# Patient Record
Sex: FEMALE | Race: WHITE | NOT HISPANIC OR LATINO
[De-identification: names, ages, dates, MRNs, and addresses within clinical notes are randomized per-mention and may not be internally consistent; named-entity substitution may affect disease eponyms.]

---

## 2017-01-03 PROBLEM — Z00.00 ENCOUNTER FOR PREVENTIVE HEALTH EXAMINATION: Noted: 2017-01-03

## 2017-01-10 ENCOUNTER — APPOINTMENT (OUTPATIENT)
Dept: SURGERY | Facility: CLINIC | Age: 32
End: 2017-01-10

## 2017-01-10 VITALS
WEIGHT: 120 LBS | HEART RATE: 70 BPM | DIASTOLIC BLOOD PRESSURE: 76 MMHG | HEIGHT: 62 IN | BODY MASS INDEX: 22.08 KG/M2 | SYSTOLIC BLOOD PRESSURE: 112 MMHG

## 2017-01-10 DIAGNOSIS — Z82.49 FAMILY HISTORY OF ISCHEMIC HEART DISEASE AND OTHER DISEASES OF THE CIRCULATORY SYSTEM: ICD-10-CM

## 2017-01-10 DIAGNOSIS — Z80.51 FAMILY HISTORY OF MALIGNANT NEOPLASM OF KIDNEY: ICD-10-CM

## 2017-01-10 DIAGNOSIS — D49.7 NEOPLASM OF UNSPECIFIED BEHAVIOR OF ENDOCRINE GLANDS AND OTHER PARTS OF NERVOUS SYSTEM: ICD-10-CM

## 2017-01-10 DIAGNOSIS — Z83.3 FAMILY HISTORY OF DIABETES MELLITUS: ICD-10-CM

## 2017-01-11 PROBLEM — Z82.49 FAMILY HISTORY OF HYPERTENSION: Status: ACTIVE | Noted: 2017-01-10

## 2017-01-11 PROBLEM — Z83.3 FAMILY HISTORY OF DIABETES MELLITUS: Status: ACTIVE | Noted: 2017-01-10

## 2017-01-11 PROBLEM — D49.7 THYROID NEOPLASM: Status: ACTIVE | Noted: 2017-01-11

## 2017-01-11 PROBLEM — Z80.51 FAMILY HISTORY OF MALIGNANT NEOPLASM OF KIDNEY: Status: ACTIVE | Noted: 2017-01-10

## 2017-01-11 RX ORDER — MULTIVITAMIN
TABLET ORAL
Refills: 0 | Status: ACTIVE | COMMUNITY

## 2017-01-30 ENCOUNTER — LABORATORY RESULT (OUTPATIENT)
Age: 32
End: 2017-01-30

## 2017-01-30 ENCOUNTER — APPOINTMENT (OUTPATIENT)
Dept: ENDOCRINOLOGY | Facility: CLINIC | Age: 32
End: 2017-01-30

## 2017-01-30 VITALS
OXYGEN SATURATION: 99 % | DIASTOLIC BLOOD PRESSURE: 64 MMHG | WEIGHT: 126 LBS | BODY MASS INDEX: 23.19 KG/M2 | SYSTOLIC BLOOD PRESSURE: 100 MMHG | HEIGHT: 62 IN | HEART RATE: 81 BPM

## 2017-01-30 RX ORDER — PSYLLIUM HUSK 0.4 G
CAPSULE ORAL
Refills: 0 | Status: ACTIVE | COMMUNITY

## 2017-02-06 LAB
T3RU NFR SERPL: 1.13 INDEX
T4 SERPL-MCNC: 8.2 UG/DL
TSH SERPL-ACNC: 1.08 UIU/ML

## 2017-02-22 ENCOUNTER — OUTPATIENT (OUTPATIENT)
Dept: OUTPATIENT SERVICES | Facility: HOSPITAL | Age: 32
LOS: 1 days | End: 2017-02-22
Payer: COMMERCIAL

## 2017-02-22 VITALS
OXYGEN SATURATION: 99 % | HEIGHT: 65 IN | HEART RATE: 80 BPM | DIASTOLIC BLOOD PRESSURE: 70 MMHG | RESPIRATION RATE: 14 BRPM | WEIGHT: 122.8 LBS | TEMPERATURE: 98 F | SYSTOLIC BLOOD PRESSURE: 111 MMHG

## 2017-02-22 DIAGNOSIS — Z01.818 ENCOUNTER FOR OTHER PREPROCEDURAL EXAMINATION: ICD-10-CM

## 2017-02-22 DIAGNOSIS — D49.7 NEOPLASM OF UNSPECIFIED BEHAVIOR OF ENDOCRINE GLANDS AND OTHER PARTS OF NERVOUS SYSTEM: ICD-10-CM

## 2017-02-22 LAB
ANION GAP SERPL CALC-SCNC: 15 MMOL/L — SIGNIFICANT CHANGE UP (ref 5–17)
BUN SERPL-MCNC: 12 MG/DL — SIGNIFICANT CHANGE UP (ref 7–23)
CALCIUM SERPL-MCNC: 9.7 MG/DL — SIGNIFICANT CHANGE UP (ref 8.4–10.5)
CHLORIDE SERPL-SCNC: 101 MMOL/L — SIGNIFICANT CHANGE UP (ref 96–108)
CO2 SERPL-SCNC: 23 MMOL/L — SIGNIFICANT CHANGE UP (ref 22–31)
CREAT SERPL-MCNC: 1.01 MG/DL — SIGNIFICANT CHANGE UP (ref 0.5–1.3)
GLUCOSE SERPL-MCNC: 91 MG/DL — SIGNIFICANT CHANGE UP (ref 70–99)
HCT VFR BLD CALC: 42.7 % — SIGNIFICANT CHANGE UP (ref 34.5–45)
HGB BLD-MCNC: 14.6 G/DL — SIGNIFICANT CHANGE UP (ref 11.5–15.5)
MCHC RBC-ENTMCNC: 30.3 PG — SIGNIFICANT CHANGE UP (ref 27–34)
MCHC RBC-ENTMCNC: 34.2 GM/DL — SIGNIFICANT CHANGE UP (ref 32–36)
MCV RBC AUTO: 88.6 FL — SIGNIFICANT CHANGE UP (ref 80–100)
PLATELET # BLD AUTO: 287 K/UL — SIGNIFICANT CHANGE UP (ref 150–400)
POTASSIUM SERPL-MCNC: 4.3 MMOL/L — SIGNIFICANT CHANGE UP (ref 3.5–5.3)
POTASSIUM SERPL-SCNC: 4.3 MMOL/L — SIGNIFICANT CHANGE UP (ref 3.5–5.3)
RBC # BLD: 4.82 M/UL — SIGNIFICANT CHANGE UP (ref 3.8–5.2)
RBC # FLD: 12.4 % — SIGNIFICANT CHANGE UP (ref 10.3–14.5)
SODIUM SERPL-SCNC: 139 MMOL/L — SIGNIFICANT CHANGE UP (ref 135–145)
WBC # BLD: 11.11 K/UL — HIGH (ref 3.8–10.5)
WBC # FLD AUTO: 11.11 K/UL — HIGH (ref 3.8–10.5)

## 2017-02-22 PROCEDURE — 85027 COMPLETE CBC AUTOMATED: CPT

## 2017-02-22 PROCEDURE — 80048 BASIC METABOLIC PNL TOTAL CA: CPT

## 2017-02-22 RX ORDER — VANCOMYCIN HCL 1 G
750 VIAL (EA) INTRAVENOUS ONCE
Qty: 0 | Refills: 0 | Status: DISCONTINUED | OUTPATIENT
Start: 2017-03-10 | End: 2017-03-10

## 2017-02-22 RX ORDER — SODIUM CHLORIDE 9 MG/ML
3 INJECTION INTRAMUSCULAR; INTRAVENOUS; SUBCUTANEOUS EVERY 8 HOURS
Qty: 0 | Refills: 0 | Status: DISCONTINUED | OUTPATIENT
Start: 2017-03-10 | End: 2017-03-10

## 2017-02-22 NOTE — H&P PST ADULT - NSANTHOSAYNRD_GEN_A_CORE
No. ZAIN screening performed.  STOP BANG Legend: 0-2 = LOW Risk; 3-4 = INTERMEDIATE Risk; 5-8 = HIGH Risk

## 2017-02-22 NOTE — H&P PST ADULT - ASSESSMENT
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

## 2017-02-22 NOTE — H&P PST ADULT - LYMPHATIC
supraclavicular L/posterior cervical R/posterior cervical L/anterior cervical R/anterior cervical L/supraclavicular R

## 2017-02-22 NOTE — H&P PST ADULT - PMH
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

## 2017-02-22 NOTE — H&P PST ADULT - PROBLEM SELECTOR PLAN 1
Total Thyroidectomy  Pre- op instructions given  medical eval done by PMD, EKG and Echo done called for the results

## 2017-02-22 NOTE — H&P PST ADULT - RS GEN PE MLT RESP DETAILS PC
breath sounds equal/respirations non-labored/clear to auscultation bilaterally/good air movement/airway patent/normal/no chest wall tenderness

## 2017-03-09 ENCOUNTER — RESULT REVIEW (OUTPATIENT)
Age: 32
End: 2017-03-09

## 2017-03-10 ENCOUNTER — OUTPATIENT (OUTPATIENT)
Dept: INPATIENT UNIT | Facility: HOSPITAL | Age: 32
LOS: 1 days | End: 2017-03-10
Payer: COMMERCIAL

## 2017-03-10 VITALS
OXYGEN SATURATION: 95 % | DIASTOLIC BLOOD PRESSURE: 57 MMHG | RESPIRATION RATE: 18 BRPM | HEART RATE: 60 BPM | SYSTOLIC BLOOD PRESSURE: 96 MMHG

## 2017-03-10 VITALS
TEMPERATURE: 98 F | DIASTOLIC BLOOD PRESSURE: 78 MMHG | RESPIRATION RATE: 18 BRPM | SYSTOLIC BLOOD PRESSURE: 112 MMHG | HEART RATE: 70 BPM | WEIGHT: 121.92 LBS | HEIGHT: 62 IN | OXYGEN SATURATION: 98 %

## 2017-03-10 DIAGNOSIS — D49.7 NEOPLASM OF UNSPECIFIED BEHAVIOR OF ENDOCRINE GLANDS AND OTHER PARTS OF NERVOUS SYSTEM: ICD-10-CM

## 2017-03-10 LAB — HCG UR QL: NEGATIVE — SIGNIFICANT CHANGE UP

## 2017-03-10 PROCEDURE — 88307 TISSUE EXAM BY PATHOLOGIST: CPT | Mod: 26

## 2017-03-10 PROCEDURE — 60240 REMOVAL OF THYROID: CPT

## 2017-03-10 PROCEDURE — 88305 TISSUE EXAM BY PATHOLOGIST: CPT | Mod: 26

## 2017-03-10 PROCEDURE — 81025 URINE PREGNANCY TEST: CPT

## 2017-03-10 PROCEDURE — 88305 TISSUE EXAM BY PATHOLOGIST: CPT

## 2017-03-10 PROCEDURE — 88307 TISSUE EXAM BY PATHOLOGIST: CPT

## 2017-03-10 PROCEDURE — C1889: CPT

## 2017-03-10 RX ORDER — SODIUM CHLORIDE 9 MG/ML
1000 INJECTION, SOLUTION INTRAVENOUS
Qty: 0 | Refills: 0 | Status: DISCONTINUED | OUTPATIENT
Start: 2017-03-10 | End: 2017-03-10

## 2017-03-10 RX ORDER — NORETHINDRONE AND ETHINYL ESTRADIOL 0.4-0.035
1 KIT ORAL
Qty: 0 | Refills: 0 | COMMUNITY

## 2017-03-10 RX ORDER — PSYLLIUM SEED (WITH DEXTROSE)
1 POWDER (GRAM) ORAL
Qty: 0 | Refills: 0 | COMMUNITY

## 2017-03-10 RX ORDER — LEVOTHYROXINE SODIUM 125 MCG
1 TABLET ORAL
Qty: 30 | Refills: 2 | OUTPATIENT
Start: 2017-03-10 | End: 2017-06-07

## 2017-03-10 RX ORDER — ONDANSETRON 8 MG/1
4 TABLET, FILM COATED ORAL ONCE
Qty: 0 | Refills: 0 | Status: DISCONTINUED | OUTPATIENT
Start: 2017-03-10 | End: 2017-03-10

## 2017-03-10 RX ORDER — HYDROMORPHONE HYDROCHLORIDE 2 MG/ML
0.25 INJECTION INTRAMUSCULAR; INTRAVENOUS; SUBCUTANEOUS
Qty: 0 | Refills: 0 | Status: DISCONTINUED | OUTPATIENT
Start: 2017-03-10 | End: 2017-03-10

## 2017-03-10 RX ORDER — LEVOTHYROXINE SODIUM 125 MCG
100 TABLET ORAL DAILY
Qty: 0 | Refills: 0 | Status: DISCONTINUED | OUTPATIENT
Start: 2017-03-10 | End: 2017-03-10

## 2017-03-10 RX ORDER — HYDROMORPHONE HYDROCHLORIDE 2 MG/ML
0.5 INJECTION INTRAMUSCULAR; INTRAVENOUS; SUBCUTANEOUS
Qty: 0 | Refills: 0 | Status: DISCONTINUED | OUTPATIENT
Start: 2017-03-10 | End: 2017-03-10

## 2017-03-10 RX ORDER — ACETAMINOPHEN 500 MG
1000 TABLET ORAL ONCE
Qty: 0 | Refills: 0 | Status: COMPLETED | OUTPATIENT
Start: 2017-03-10 | End: 2017-03-10

## 2017-03-10 RX ORDER — OXYCODONE HYDROCHLORIDE 5 MG/1
1 TABLET ORAL
Qty: 12 | Refills: 0 | OUTPATIENT
Start: 2017-03-10 | End: 2017-03-12

## 2017-03-10 RX ADMIN — HYDROMORPHONE HYDROCHLORIDE 0.5 MILLIGRAM(S): 2 INJECTION INTRAMUSCULAR; INTRAVENOUS; SUBCUTANEOUS at 15:31

## 2017-03-10 RX ADMIN — HYDROMORPHONE HYDROCHLORIDE 0.5 MILLIGRAM(S): 2 INJECTION INTRAMUSCULAR; INTRAVENOUS; SUBCUTANEOUS at 14:34

## 2017-03-10 RX ADMIN — SODIUM CHLORIDE 3 MILLILITER(S): 9 INJECTION INTRAMUSCULAR; INTRAVENOUS; SUBCUTANEOUS at 05:56

## 2017-03-10 RX ADMIN — Medication 100 MICROGRAM(S): at 15:31

## 2017-03-10 RX ADMIN — HYDROMORPHONE HYDROCHLORIDE 0.5 MILLIGRAM(S): 2 INJECTION INTRAMUSCULAR; INTRAVENOUS; SUBCUTANEOUS at 11:30

## 2017-03-10 RX ADMIN — Medication 400 MILLIGRAM(S): at 11:05

## 2017-03-10 RX ADMIN — SODIUM CHLORIDE 75 MILLILITER(S): 9 INJECTION, SOLUTION INTRAVENOUS at 11:45

## 2017-03-10 RX ADMIN — HYDROMORPHONE HYDROCHLORIDE 0.5 MILLIGRAM(S): 2 INJECTION INTRAMUSCULAR; INTRAVENOUS; SUBCUTANEOUS at 11:20

## 2017-03-10 RX ADMIN — Medication 1000 MILLIGRAM(S): at 11:30

## 2017-03-10 NOTE — BRIEF OPERATIVE NOTE - OPERATION/FINDINGS
L thyroid nodule w/ [place] 3 FNA; R thyroid nodule w/ [place] 2 FNA; genetic testing done w/ 80% likelihood of malignancy  L thyroid lobe very enlarged; b/l RLN identified and preserved; #7 flat MYLES placed underneath strap muscles    OPERATION: total thyroidectomy

## 2017-03-10 NOTE — ASU DISCHARGE PLAN (ADULT/PEDIATRIC). - MEDICATION SUMMARY - MEDICATIONS TO TAKE
I will START or STAY ON the medications listed below when I get home from the hospital:    oxyCODONE 5 mg oral tablet  -- 1 tab(s) by mouth every 4 hours, As Needed -for severe pain MDD:6  -- Indication: For Postoperative pain    Tylenol 500 mg oral tablet  -- 2 tab(s) by mouth every 6 hours  -- Indication: For Postoperative pain    Motrin 400 mg oral tablet  -- 1 tab(s) by mouth every 6 hours  -- Indication: For Postoperative pain    Synthroid 100 mcg (0.1 mg) oral tablet  -- 1 tab(s) by mouth once a day MDD:1  -- Indication: For Hypothryoidism after thyroidectomy    Calcium 500+D oral tablet, chewable  -- 1 tab(s) by mouth 2 times a day MDD:2  -- Indication: For Calcium supplementation

## 2017-03-10 NOTE — ASU DISCHARGE PLAN (ADULT/PEDIATRIC). - FOLLOWUP APPOINTMENT CLINIC/PHYSICIAN
Please make an appointment to see Dr. Paulson on Monday, 3/13, for drain removal and follow up. Please also follow up with your endocrinologist.

## 2017-03-13 ENCOUNTER — APPOINTMENT (OUTPATIENT)
Dept: SURGERY | Facility: CLINIC | Age: 32
End: 2017-03-13

## 2017-03-13 VITALS
HEART RATE: 80 BPM | WEIGHT: 122 LBS | SYSTOLIC BLOOD PRESSURE: 134 MMHG | DIASTOLIC BLOOD PRESSURE: 85 MMHG | BODY MASS INDEX: 22.45 KG/M2 | HEIGHT: 62 IN

## 2017-03-13 LAB — SURGICAL PATHOLOGY STUDY: SIGNIFICANT CHANGE UP

## 2017-03-13 RX ORDER — ACETAMINOPHEN 500 MG
2 TABLET ORAL
Qty: 0 | Refills: 0 | COMMUNITY

## 2017-03-13 RX ORDER — IBUPROFEN 200 MG
1 TABLET ORAL
Qty: 0 | Refills: 0 | COMMUNITY

## 2017-03-24 ENCOUNTER — APPOINTMENT (OUTPATIENT)
Dept: SURGERY | Facility: CLINIC | Age: 32
End: 2017-03-24

## 2017-03-24 DIAGNOSIS — Z78.9 OTHER SPECIFIED HEALTH STATUS: ICD-10-CM

## 2017-04-06 PROBLEM — Z78.9 NON-SMOKER: Status: ACTIVE | Noted: 2017-03-13

## 2017-04-11 ENCOUNTER — APPOINTMENT (OUTPATIENT)
Dept: SURGERY | Facility: CLINIC | Age: 32
End: 2017-04-11

## 2017-04-11 DIAGNOSIS — D49.7 NEOPLASM OF UNSPECIFIED BEHAVIOR OF ENDOCRINE GLANDS AND OTHER PARTS OF NERVOUS SYSTEM: ICD-10-CM

## 2017-04-13 ENCOUNTER — FORM ENCOUNTER (OUTPATIENT)
Age: 32
End: 2017-04-13

## 2017-04-14 ENCOUNTER — OUTPATIENT (OUTPATIENT)
Dept: OUTPATIENT SERVICES | Facility: HOSPITAL | Age: 32
LOS: 1 days | End: 2017-04-14
Payer: COMMERCIAL

## 2017-04-14 ENCOUNTER — APPOINTMENT (OUTPATIENT)
Dept: RADIOLOGY | Facility: CLINIC | Age: 32
End: 2017-04-14

## 2017-04-14 DIAGNOSIS — Z00.8 ENCOUNTER FOR OTHER GENERAL EXAMINATION: ICD-10-CM

## 2017-04-14 PROCEDURE — 71046 X-RAY EXAM CHEST 2 VIEWS: CPT

## 2017-05-02 ENCOUNTER — APPOINTMENT (OUTPATIENT)
Dept: ENDOCRINOLOGY | Facility: CLINIC | Age: 32
End: 2017-05-02

## 2017-05-02 VITALS
HEIGHT: 62 IN | BODY MASS INDEX: 23.19 KG/M2 | SYSTOLIC BLOOD PRESSURE: 100 MMHG | HEART RATE: 78 BPM | DIASTOLIC BLOOD PRESSURE: 60 MMHG | OXYGEN SATURATION: 99 % | WEIGHT: 126 LBS

## 2017-05-02 DIAGNOSIS — E04.2 NONTOXIC MULTINODULAR GOITER: ICD-10-CM

## 2017-05-04 PROBLEM — E04.2 MULTINODULAR GOITER: Status: RESOLVED | Noted: 2017-01-31 | Resolved: 2017-05-04

## 2017-05-04 LAB
CALCIUM SERPL-MCNC: 9.3 MG/DL
PARATHYROID HORMONE INTACT: 20 PG/ML

## 2017-05-23 ENCOUNTER — APPOINTMENT (OUTPATIENT)
Dept: SURGERY | Facility: CLINIC | Age: 32
End: 2017-05-23

## 2017-08-01 ENCOUNTER — RX RENEWAL (OUTPATIENT)
Age: 32
End: 2017-08-01

## 2017-11-10 ENCOUNTER — RX RENEWAL (OUTPATIENT)
Age: 32
End: 2017-11-10

## 2017-12-28 ENCOUNTER — LABORATORY RESULT (OUTPATIENT)
Age: 32
End: 2017-12-28

## 2017-12-28 ENCOUNTER — APPOINTMENT (OUTPATIENT)
Dept: ENDOCRINOLOGY | Facility: CLINIC | Age: 32
End: 2017-12-28
Payer: COMMERCIAL

## 2017-12-28 VITALS
BODY MASS INDEX: 23.19 KG/M2 | SYSTOLIC BLOOD PRESSURE: 108 MMHG | OXYGEN SATURATION: 98 % | DIASTOLIC BLOOD PRESSURE: 70 MMHG | HEART RATE: 74 BPM | HEIGHT: 62 IN | WEIGHT: 126 LBS

## 2017-12-28 PROCEDURE — 99213 OFFICE O/P EST LOW 20 MIN: CPT

## 2017-12-29 LAB
T3RU NFR SERPL: 1.11 INDEX
T4 SERPL-MCNC: 9 UG/DL
TSH SERPL-ACNC: 5.18 UIU/ML

## 2018-05-02 NOTE — H&P PST ADULT - FALL HARM RISK CONCLUSION
I personally performed the service described in the documentation recorded by the scribe in my presence, and it accurately and completely records my words and actions.
Universal Safety Interventions

## 2018-07-16 PROBLEM — E04.1 NONTOXIC SINGLE THYROID NODULE: Chronic | Status: ACTIVE | Noted: 2017-02-22

## 2018-12-10 ENCOUNTER — RX RENEWAL (OUTPATIENT)
Age: 33
End: 2018-12-10

## 2019-01-07 ENCOUNTER — CLINICAL ADVICE (OUTPATIENT)
Age: 34
End: 2019-01-07

## 2019-03-01 ENCOUNTER — CLINICAL ADVICE (OUTPATIENT)
Age: 34
End: 2019-03-01

## 2019-06-27 PROBLEM — D49.7 NEOPLASM OF UNSPECIFIED BEHAVIOR OF ENDOCRINE GLANDS AND OTHER PARTS OF NERVOUS SYSTEM: Chronic | Status: ACTIVE | Noted: 2017-02-22

## 2019-07-16 ENCOUNTER — CLINICAL ADVICE (OUTPATIENT)
Age: 34
End: 2019-07-16

## 2019-07-16 NOTE — BRIEF OPERATIVE NOTE - ESTIMATED BLOOD LOSS
PATIENT INFORMATION    Anticipatory guidance:  Poison Control phone number [phone]  Setting hot water heater less than 120 degrees F  Use of transitional object (clemente bear, etc.) to help with sleep    Follow-Up  - Return for your 3 year well child visit.    2 years old Health and Safety Tips - The following hyperlinks are available to access via [employer]    Bright Futures 3 Years Old Parent Education    Fut[employer]s [employer]illantes 2 años de edad (Educación para los padres) - Español    Common dosing for acetaminophen and ibuprofen:   Acetaminophen (Tylenol, etc.) can be given every 4 hours.  · Infant Drops: 160mg/5ml for  Weight 18-23 lbs 24-35 lbs   Dose 3.75 ml 5 ml      · Children's Elixir: 160mg/5ml  Weight 24-35 lbs 36-47 lbs 48-59 lbs 60-71 lsb 72-95 lbs   Dose 5 ml 7.5 ml 10 ml 12.5 ml 15 ml     Ibuprofen (motrin) can be given every 6 hours.  · Infant Drops: 50mg/1.25ml  Weight 18-23 lbs   Dose 1.875     · Children's Elixir 100mg/5ml   Weight 24-35 lbs 36-47 lbs 48-59 lbs 60-71 lbs 72-95 lbs   Dose 1 tsp 1 1/2 tsp 2 tsp 2 1/2 tsp 3 tsp     Additional Educational Resources:  For additional resources regarding your symptoms, diagnosis, or further health information, please visit the Discover a Healthier You section on [email or web address] or the Online Health Resources section in [employer].   5

## 2019-07-29 ENCOUNTER — APPOINTMENT (OUTPATIENT)
Dept: ENDOCRINOLOGY | Facility: CLINIC | Age: 34
End: 2019-07-29

## 2019-11-18 ENCOUNTER — APPOINTMENT (OUTPATIENT)
Dept: ENDOCRINOLOGY | Facility: CLINIC | Age: 34
End: 2019-11-18
Payer: COMMERCIAL

## 2019-11-18 VITALS
OXYGEN SATURATION: 98 % | SYSTOLIC BLOOD PRESSURE: 100 MMHG | BODY MASS INDEX: 25.95 KG/M2 | DIASTOLIC BLOOD PRESSURE: 70 MMHG | HEART RATE: 76 BPM | WEIGHT: 141 LBS | HEIGHT: 62 IN

## 2019-11-18 PROCEDURE — 99214 OFFICE O/P EST MOD 30 MIN: CPT

## 2019-11-18 NOTE — HISTORY OF PRESENT ILLNESS
[FreeTextEntry1] : cc: postoperative hypothyroidism\par \par 33 year old woman with history of thyroid nodules, had FNA with [place] 3 (AUS) and thyroseq showing kras mutation.  She underwent total thyroidectomy in March 2017.\par Surgical pathology showed follicular adenoma\par Now 12 weeks postpartum, taking  levothyroxine tx, dose was decreased to 112 micrograms daily last week. She takes it on an empty stomach separate from food and vitamins\par No recent change in weight.  No tremor, palpitations.\par \par \par Has noted numbness/heat sensation in feet and hands for past few weeks.\par \par \par \par \par

## 2019-11-18 NOTE — ASSESSMENT
[FreeTextEntry1] : 33 year old woman post thyroidectomy for thyroid nodule with AUS cytology/ kras mutation with benign surgical pathology, now with hypothyroidism on levothyroxine tx, 12 weeks postpartum\par   - Dose was decreased last week.  Repeat TFTs in 6-8 weeks, adjust levothyroxine as needed\par \par Numbness - Discussed potential etiologies.   Recommend discussing peripheral numbness with PMD if symptoms do not resolve\par \par f/u  one year\par

## 2019-11-18 NOTE — PHYSICAL EXAM
[No Acute Distress] : no acute distress [Alert] : alert [Well Nourished] : well nourished [Normal Sclera/Conjunctiva] : normal sclera/conjunctiva [No Proptosis] : no proptosis [Well Developed] : well developed [No Neck Mass] : no neck mass was observed [Well Healed Scar] : well healed scar [No LAD] : no lymphadenopathy [No Respiratory Distress] : no respiratory distress [Clear to Auscultation] : lungs were clear to auscultation bilaterally [Regular Rhythm] : with a regular rhythm [Normal S1, S2] : normal S1 and S2 [Normal Bowel Sounds] : normal bowel sounds [No Edema] : there was no peripheral edema [Soft] : abdomen soft [No Spinal Tenderness] : no spinal tenderness [Not Tender] : non-tender [Normal Strength/Tone] : muscle strength and tone were normal [Normal Reflexes] : deep tendon reflexes were 2+ and symmetric [No Tremors] : no tremors [Normal Affect] : the affect was normal [Normal Mood] : the mood was normal

## 2020-11-30 ENCOUNTER — LABORATORY RESULT (OUTPATIENT)
Age: 35
End: 2020-11-30

## 2020-11-30 ENCOUNTER — APPOINTMENT (OUTPATIENT)
Dept: ENDOCRINOLOGY | Facility: CLINIC | Age: 35
End: 2020-11-30
Payer: COMMERCIAL

## 2020-11-30 VITALS
SYSTOLIC BLOOD PRESSURE: 100 MMHG | HEIGHT: 62 IN | HEART RATE: 75 BPM | BODY MASS INDEX: 22.08 KG/M2 | TEMPERATURE: 98.3 F | DIASTOLIC BLOOD PRESSURE: 70 MMHG | OXYGEN SATURATION: 98 % | WEIGHT: 120 LBS

## 2020-11-30 PROCEDURE — 99072 ADDL SUPL MATRL&STAF TM PHE: CPT

## 2020-11-30 PROCEDURE — 99213 OFFICE O/P EST LOW 20 MIN: CPT

## 2020-11-30 NOTE — PHYSICAL EXAM
[Alert] : alert [Healthy Appearance] : healthy appearance [No Acute Distress] : no acute distress [Normal Sclera/Conjunctiva] : normal sclera/conjunctiva [No Proptosis] : no proptosis [No Neck Mass] : no neck mass was observed [No LAD] : no lymphadenopathy [Well Healed Scar] : well healed scar [No Respiratory Distress] : no respiratory distress [Clear to Auscultation] : lungs were clear to auscultation bilaterally [Normal S1, S2] : normal S1 and S2 [Regular Rhythm] : with a regular rhythm [No Edema] : no peripheral edema [Normal Bowel Sounds] : normal bowel sounds [Not Tender] : non-tender [Soft] : abdomen soft [No Spinal Tenderness] : no spinal tenderness [No Involuntary Movements] : no involuntary movements were seen [Normal Reflexes] : deep tendon reflexes were 2+ and symmetric [No Tremors] : no tremors [Normal Affect] : the affect was normal [Normal Mood] : the mood was normal

## 2020-12-01 LAB
T3RU NFR SERPL: 1 TBI
T4 SERPL-MCNC: 8 UG/DL
TSH SERPL-ACNC: 2.13 UIU/ML

## 2020-12-01 NOTE — ASSESSMENT
[FreeTextEntry1] : 34 year old woman post thyroidectomy for thyroid nodule with AUS cytology/ kras mutation with benign surgical pathology, now with hypothyroidism on levothyroxine tx.\par  - Clinically euthyroid\par   - Check tfts and adjust levothyroxine dose as needed\par \par Numbness - has resolved, may have been related to reaction to vancomycin\par \par f/u  one year

## 2020-12-01 NOTE — DATA REVIEWED
[FreeTextEntry1] : 1/2020\par TSH 0.09\par FTI 3.2\par \par \par \par 11/2019\par TSH 0.03\par FTI 3.8

## 2020-12-01 NOTE — HISTORY OF PRESENT ILLNESS
[FreeTextEntry1] : cc: postoperative hypothyroidism\par \par 34 year old woman with history of thyroid nodules, had FNA with [place] 3 (AUS) and thyroseq showing kras mutation.  She underwent total thyroidectomy in March 2017.\par Surgical pathology showed follicular adenoma\par She has been feeling well.  She takes levothyroxine tx,daily on an empty stomach separate from food and vitamins\par No cold intolerance, constipation, fatigue.  No recent change in weight.  No tremor, palpitations.\par \par Had mastitis, then c diff infection, is on antibiotics\par \par Numbness had resolved, then returned when taking vancomycin.  Resolved again off Vanco\par \par

## 2020-12-02 ENCOUNTER — NON-APPOINTMENT (OUTPATIENT)
Age: 35
End: 2020-12-02

## 2021-02-16 ENCOUNTER — RX RENEWAL (OUTPATIENT)
Age: 36
End: 2021-02-16

## 2022-02-22 ENCOUNTER — RX RENEWAL (OUTPATIENT)
Age: 37
End: 2022-02-22

## 2022-03-21 ENCOUNTER — RX CHANGE (OUTPATIENT)
Age: 37
End: 2022-03-21

## 2022-04-26 ENCOUNTER — APPOINTMENT (OUTPATIENT)
Dept: ENDOCRINOLOGY | Facility: CLINIC | Age: 37
End: 2022-04-26
Payer: COMMERCIAL

## 2022-04-26 VITALS
DIASTOLIC BLOOD PRESSURE: 80 MMHG | HEART RATE: 67 BPM | OXYGEN SATURATION: 96 % | SYSTOLIC BLOOD PRESSURE: 124 MMHG | BODY MASS INDEX: 23.59 KG/M2 | WEIGHT: 129 LBS | TEMPERATURE: 98.2 F

## 2022-04-26 DIAGNOSIS — R20.0 ANESTHESIA OF SKIN: ICD-10-CM

## 2022-04-26 PROCEDURE — 99214 OFFICE O/P EST MOD 30 MIN: CPT

## 2022-04-26 NOTE — PHYSICAL EXAM
[Alert] : alert [Healthy Appearance] : healthy appearance [No Acute Distress] : no acute distress [Normal Sclera/Conjunctiva] : normal sclera/conjunctiva [No Proptosis] : no proptosis [No Neck Mass] : no neck mass was observed [No Respiratory Distress] : no respiratory distress [Clear to Auscultation] : lungs were clear to auscultation bilaterally [Normal S1, S2] : normal S1 and S2 [Regular Rhythm] : with a regular rhythm [No Edema] : no peripheral edema [Normal Bowel Sounds] : normal bowel sounds [Soft] : abdomen soft [No Spinal Tenderness] : no spinal tenderness [No Involuntary Movements] : no involuntary movements were seen [Normal Strength/Tone] : muscle strength and tone were normal [Normal Reflexes] : deep tendon reflexes were 2+ and symmetric [No Tremors] : no tremors [Normal Affect] : the affect was normal [Normal Mood] : the mood was normal

## 2022-04-26 NOTE — ASSESSMENT
[FreeTextEntry1] : 34 year old woman post thyroidectomy for thyroid nodule with AUS cytology/ kras mutation with benign surgical pathology, now with hypothyroidism on levothyroxine tx.\par  - Pt with symptoms of hypothyroidism, check TFTs and adjust levothyroxine dose as needed\par \par Numbness - recommend speaking to PMD regarding further evaluation and management.\par  - Will also check A1c to screen for diabetes\par \par f/u  one year

## 2022-04-26 NOTE — HISTORY OF PRESENT ILLNESS
[FreeTextEntry1] : cc: postoperative hypothyroidism\par \par 36 year old woman with history of thyroid nodules, had FNA with [place] 3 (AUS) and thyroseq showing kras mutation.  She underwent total thyroidectomy in March 2017.\par Surgical pathology showed follicular adenoma\par She takes levothyroxine 100 micrograms,daily on an empty stomach separate from food and vitamins\par She has been feeling well overall.  Has been tired recently, no cold intolerance, constipation,has gained weight and having trouble losing it.  No tremor, palpitations.\par \par Has noted intermittent numbness in her toes.\par \par \par \par

## 2022-04-27 LAB
ESTIMATED AVERAGE GLUCOSE: 88 MG/DL
HBA1C MFR BLD HPLC: 4.7 %
T4 FREE SERPL-MCNC: 1.5 NG/DL
TSH SERPL-ACNC: 5.53 UIU/ML

## 2022-09-16 ENCOUNTER — NON-APPOINTMENT (OUTPATIENT)
Age: 37
End: 2022-09-16

## 2022-11-15 NOTE — H&P PST ADULT - NSANTHAGERD_ENT_A_CORE
[Fever] : no fever [Chills] : no chills [Night Sweats] : no night sweats [Recent Change In Weight] : ~T recent weight change [Visual Disturbances] : visual disturbances [Negative] : Psychiatric [FreeTextEntry2] : intentional weight loss 20 lbs [FreeTextEntry3] : right visual field compromised No

## 2023-02-10 ENCOUNTER — NON-APPOINTMENT (OUTPATIENT)
Age: 38
End: 2023-02-10

## 2023-04-03 ENCOUNTER — RX RENEWAL (OUTPATIENT)
Age: 38
End: 2023-04-03

## 2023-06-07 ENCOUNTER — RX RENEWAL (OUTPATIENT)
Age: 38
End: 2023-06-07

## 2024-01-10 ENCOUNTER — RX RENEWAL (OUTPATIENT)
Age: 39
End: 2024-01-10

## 2024-02-23 NOTE — H&P PST ADULT - HISTORY OF PRESENT ILLNESS
Conjuntivae and eyelids appear normal, Sclerae : White without injection; EOMi
32 y/o female with h/o thyroid nodules diagnosed in 2013(Rt and Left) ,the nodules was benign at that time  .Pt  has been F/U with  endocrinology  for the FNA . In January ultra sound showed growth of both nodules Rt one was benign and left nodule Robert  with mutation, pt denies any  dyspnea or dysphagia. Now Pt  coming in PST for Total Thyroidectomy on 310/2017.

## 2024-03-14 NOTE — H&P PST ADULT - MOUTH
"Daily Note     Today's date: 3/14/2024  Patient name: Ralph Leyva  : 1961  MRN: [id number]  Referring provider: Sergio Reid*  Dx:   Encounter Diagnosis     ICD-10-CM    1. Tear of left supraspinatus tendon  M75.102           Start Time: 1405  Stop Time: 1445  Total time in clinic (min): 40 minutes    Subjective: Pt stated that he has no new complaints of pain or discomfort since last visit.       Objective: See treatment diary below      Assessment: Pt tolerated today's session well with no adverse symptoms. Pt needed minimal VCing for form and technique. Pt tolerated application of ice at end of session well with reported decrease in discomfort post modality. Pt would benefit from continued skilled PT to improve L shoulder mobility, PROM, scapular stability, strength, and functional ability.     Plan: Continue per plan of care.  Progress treatment as tolerated.       Precautions: s/p L shoulder arthroscopic RTC repair with biceps tenodesis on 24  As per MD (24): NWB LUE in sling x 6 weeks (remove sling on 24).  Can remove Pillow at any time as it's for comfort only.  For now, may start elbow/wrist/hand range of motion. Pendulums to tolerance   Protocol in office visit with MD on 24    Date 2/27 2/29 3/5 3/7 3/12 3/14       Visit # IE 2 3 4 5 6       FOTO IE             Re-eval IE              Manuals 2/27 2/29 3/5 3/7 3/12 3/14       L shoulder PROM nv HY KM DK DK HY                                              Neuro Re-Ed 2/27 2/29 3/5 3/7 3/12 3/14       Scap retracts 5x3\" 2x10 3\" 2x10 3\" 20x 3\" 20x5\" 20x5\"       Pendulums 30\" fwd,s/s 30\" fwd,s/s 30\" fwd, s/s 1' fwd, s/s, circles c,cc 1' fwd, s/s, circles 1' fwd, s/s, circles                                                           Pt Edu DK (sling, HEP)   NIELS DK        Ther Ex 2/27 2/29 3/5 3/7 3/12 3/14       Wrist flex AROM 10x  2x10 2x10 2x10 2x10 2x10       Wrist ext AROM 10x 2x10 2x10 2x10 2x10 2x10       Elbow " "flex/ext AROM 10x  2x10 2x10 2x10 2x10  2x10       Towel squeezes nv Red 20x5\" Red 20x5\" 20x5\" Red 20x5\" green 20x5\" green       Table slides hold 10x10\" pain free 10x10\" 5x10\" pain free flex 10x10\" pain free flex 10x10\" pain free flex                                              Ther Activity  2/29 3/5   3/14                                 Gait Training  2/29 3/5                                    Modalities 2/27 2/29 3/5 3/7 3/12 3/14       ice prn CP 10' post CP 10' post 10' post 10' post 10' post                                " normal mouth and gums/moist

## 2024-04-01 ENCOUNTER — APPOINTMENT (OUTPATIENT)
Dept: ENDOCRINOLOGY | Facility: CLINIC | Age: 39
End: 2024-04-01
Payer: COMMERCIAL

## 2024-04-01 VITALS
DIASTOLIC BLOOD PRESSURE: 62 MMHG | OXYGEN SATURATION: 98 % | WEIGHT: 140 LBS | BODY MASS INDEX: 25.76 KG/M2 | HEART RATE: 64 BPM | SYSTOLIC BLOOD PRESSURE: 116 MMHG | HEIGHT: 62 IN

## 2024-04-01 DIAGNOSIS — E89.0 POSTPROCEDURAL HYPOTHYROIDISM: ICD-10-CM

## 2024-04-01 DIAGNOSIS — R63.5 ABNORMAL WEIGHT GAIN: ICD-10-CM

## 2024-04-01 PROCEDURE — G2211 COMPLEX E/M VISIT ADD ON: CPT

## 2024-04-01 PROCEDURE — 99214 OFFICE O/P EST MOD 30 MIN: CPT

## 2024-04-01 NOTE — PHYSICAL EXAM
[Alert] : alert [Healthy Appearance] : healthy appearance [No Acute Distress] : no acute distress [No Proptosis] : no proptosis [Normal Sclera/Conjunctiva] : normal sclera/conjunctiva [No Neck Mass] : no neck mass was observed [No LAD] : no lymphadenopathy [Well Healed Scar] : well healed scar [Clear to Auscultation] : lungs were clear to auscultation bilaterally [No Respiratory Distress] : no respiratory distress [Regular Rhythm] : with a regular rhythm [Normal S1, S2] : normal S1 and S2 [No Edema] : no peripheral edema [Normal Bowel Sounds] : normal bowel sounds [Not Tender] : non-tender [No Spinal Tenderness] : no spinal tenderness [Soft] : abdomen soft [No Involuntary Movements] : no involuntary movements were seen [Normal Strength/Tone] : muscle strength and tone were normal [Normal Reflexes] : deep tendon reflexes were 2+ and symmetric [No Tremors] : no tremors [Normal Affect] : the affect was normal [Normal Mood] : the mood was normal

## 2024-04-01 NOTE — ASSESSMENT
[FreeTextEntry1] : 38 year old woman post thyroidectomy for thyroid nodule with AUS cytology/ kras mutation with benign surgical pathology, now with hypothyroidism on levothyroxine tx.  - Pt with symptoms of hypothyroidism, check TFTs and adjust levothyroxine dose as needed  Weight gain  - check TFTs as above  - check A1c ( if elevated, can consider glp1)  - check midnight salivary cortisol to exclude Hypercortisolism  f/u  one year

## 2024-04-01 NOTE — HISTORY OF PRESENT ILLNESS
[FreeTextEntry1] : cc: postoperative hypothyroidism  38 year old woman with history of thyroid nodules, had FNA with [place] 3 (AUS) and thyroseq showing kras mutation.  She underwent total thyroidectomy in March 2017. Surgical pathology showed follicular adenoma She has been feeling well. She takes levothyroxine 112 micrograms, x 6.5 per week, on an empty stomach separate from food and vitamins Reports no cold intolerance, constipation, depression.  No tremor, palpitations, weight loss.  Has had continued weight gain despite diet and exercise.   No abnormal weight distribution, no striae, no thin skin, no bruising, no muscle weakness

## 2024-04-03 LAB
ESTIMATED AVERAGE GLUCOSE: 94 MG/DL
HBA1C MFR BLD HPLC: 4.9 %
T4 FREE SERPL-MCNC: 1.5 NG/DL
TSH SERPL-ACNC: 0.43 UIU/ML

## 2024-04-03 RX ORDER — LEVOTHYROXINE SODIUM 0.11 MG/1
112 TABLET ORAL
Qty: 90 | Refills: 3 | Status: ACTIVE | COMMUNITY
Start: 2017-03-10 | End: 1900-01-01

## 2024-05-07 LAB — CORTIS SAL-MCNC: NORMAL

## 2024-07-15 ENCOUNTER — RX RENEWAL (OUTPATIENT)
Age: 39
End: 2024-07-15

## 2025-08-05 ENCOUNTER — RX RENEWAL (OUTPATIENT)
Age: 40
End: 2025-08-05